# Patient Record
Sex: MALE | URBAN - METROPOLITAN AREA
[De-identification: names, ages, dates, MRNs, and addresses within clinical notes are randomized per-mention and may not be internally consistent; named-entity substitution may affect disease eponyms.]

---

## 2022-05-05 ENCOUNTER — OFFICE VISIT (OUTPATIENT)
Dept: URBAN - METROPOLITAN AREA CLINIC 50 | Facility: CLINIC | Age: 87
End: 2022-05-05
Payer: MEDICARE

## 2022-05-05 VITALS
BODY MASS INDEX: 25.07 KG/M2 | SYSTOLIC BLOOD PRESSURE: 104 MMHG | DIASTOLIC BLOOD PRESSURE: 63 MMHG | HEART RATE: 67 BPM | HEIGHT: 66 IN | TEMPERATURE: 96.1 F | WEIGHT: 156 LBS

## 2022-05-05 DIAGNOSIS — Z79.01 LONG TERM (CURRENT) USE OF ANTICOAGULANTS: ICD-10-CM

## 2022-05-05 DIAGNOSIS — R54 ADVANCED AGE: ICD-10-CM

## 2022-05-05 DIAGNOSIS — R69 CO-MORBID CONDITION: ICD-10-CM

## 2022-05-05 DIAGNOSIS — R13.12 OROPHARYNGEAL DYSPHAGIA: ICD-10-CM

## 2022-05-05 DIAGNOSIS — J38.7 MUCUS POOLING IN LARYNX: ICD-10-CM

## 2022-05-05 DIAGNOSIS — I63.9 CEREBROVASCULAR ACCIDENT (CVA), UNSPECIFIED MECHANISM: ICD-10-CM

## 2022-05-05 PROBLEM — 71457002: Status: ACTIVE | Noted: 2022-05-05

## 2022-05-05 PROBLEM — 230690007: Status: ACTIVE | Noted: 2022-05-05

## 2022-05-05 PROBLEM — 711150003: Status: ACTIVE | Noted: 2022-05-05

## 2022-05-05 PROBLEM — 49808004: Status: ACTIVE | Noted: 2022-05-05

## 2022-05-05 PROCEDURE — 99244 OFF/OP CNSLTJ NEW/EST MOD 40: CPT | Performed by: INTERNAL MEDICINE

## 2022-05-05 NOTE — HPI-TODAY'S VISIT:
The patient was referred by PCP Dr. Alonzo Duncan for dysphagia.   A copy of this document is being forwarded to the referring provider.  87 y.o. WM, retired, , , 2 children Not sure why he is here Presents w/ daughter who gives hx While in [place], started spitting up after eating Syx have progressively worsened Spits up mostly liquid and phlegm - thick mucus w/ liquid - can last for 30 minutes No emesis of food - maybe just small amts In [place] - had CT scan - smal hernia

## 2022-05-11 ENCOUNTER — TELEPHONE ENCOUNTER (OUTPATIENT)
Dept: URBAN - METROPOLITAN AREA CLINIC 50 | Facility: CLINIC | Age: 87
End: 2022-05-11

## 2022-05-19 ENCOUNTER — LAB OUTSIDE AN ENCOUNTER (OUTPATIENT)
Dept: URBAN - METROPOLITAN AREA CLINIC 96 | Facility: CLINIC | Age: 87
End: 2022-05-19

## 2022-05-19 ENCOUNTER — DASHBOARD ENCOUNTERS (OUTPATIENT)
Age: 87
End: 2022-05-19

## 2022-05-19 ENCOUNTER — WEB ENCOUNTER (OUTPATIENT)
Dept: URBAN - METROPOLITAN AREA CLINIC 92 | Facility: CLINIC | Age: 87
End: 2022-05-19

## 2022-05-19 RX ORDER — PANTOPRAZOLE SODIUM 40 MG/1
AS DIRECTED TABLET, DELAYED RELEASE ORAL
Qty: 135 | Refills: 0 | OUTPATIENT